# Patient Record
Sex: FEMALE | Race: WHITE | NOT HISPANIC OR LATINO
[De-identification: names, ages, dates, MRNs, and addresses within clinical notes are randomized per-mention and may not be internally consistent; named-entity substitution may affect disease eponyms.]

---

## 2018-03-19 ENCOUNTER — RESULT REVIEW (OUTPATIENT)
Age: 28
End: 2018-03-19

## 2018-04-19 ENCOUNTER — RESULT REVIEW (OUTPATIENT)
Age: 28
End: 2018-04-19

## 2019-02-05 ENCOUNTER — RESULT REVIEW (OUTPATIENT)
Age: 29
End: 2019-02-05

## 2019-02-19 ENCOUNTER — APPOINTMENT (OUTPATIENT)
Dept: OTOLARYNGOLOGY | Facility: CLINIC | Age: 29
End: 2019-02-19
Payer: COMMERCIAL

## 2019-02-19 VITALS
HEIGHT: 65 IN | SYSTOLIC BLOOD PRESSURE: 136 MMHG | HEART RATE: 74 BPM | WEIGHT: 193 LBS | DIASTOLIC BLOOD PRESSURE: 91 MMHG | BODY MASS INDEX: 32.15 KG/M2

## 2019-02-19 DIAGNOSIS — J32.9 CHRONIC SINUSITIS, UNSPECIFIED: ICD-10-CM

## 2019-02-19 DIAGNOSIS — Z83.3 FAMILY HISTORY OF DIABETES MELLITUS: ICD-10-CM

## 2019-02-19 DIAGNOSIS — Z82.49 FAMILY HISTORY OF ISCHEMIC HEART DISEASE AND OTHER DISEASES OF THE CIRCULATORY SYSTEM: ICD-10-CM

## 2019-02-19 DIAGNOSIS — Z86.79 PERSONAL HISTORY OF OTHER DISEASES OF THE CIRCULATORY SYSTEM: ICD-10-CM

## 2019-02-19 DIAGNOSIS — F19.90 OTHER PSYCHOACTIVE SUBSTANCE USE, UNSPECIFIED, UNCOMPLICATED: ICD-10-CM

## 2019-02-19 DIAGNOSIS — Z80.9 FAMILY HISTORY OF MALIGNANT NEOPLASM, UNSPECIFIED: ICD-10-CM

## 2019-02-19 PROCEDURE — 99214 OFFICE O/P EST MOD 30 MIN: CPT | Mod: 25

## 2019-02-19 PROCEDURE — 31231 NASAL ENDOSCOPY DX: CPT

## 2019-02-22 PROBLEM — J32.9 OTHER SINUSITIS: Status: ACTIVE | Noted: 2019-02-22

## 2019-02-22 PROBLEM — Z80.9 FAMILY HISTORY OF MALIGNANT NEOPLASM: Status: ACTIVE | Noted: 2019-02-19

## 2019-02-22 PROBLEM — Z82.49 FAMILY HISTORY OF HYPERTENSION: Status: ACTIVE | Noted: 2019-02-19

## 2019-02-22 PROBLEM — Z86.79 HISTORY OF HYPERTENSION: Status: RESOLVED | Noted: 2019-02-19 | Resolved: 2019-02-22

## 2019-02-22 PROBLEM — Z83.3 FAMILY HISTORY OF DIABETES MELLITUS: Status: ACTIVE | Noted: 2019-02-19

## 2019-02-22 PROBLEM — F19.90 RECREATIONAL DRUG USE: Status: ACTIVE | Noted: 2019-02-19

## 2019-02-22 RX ORDER — PREDNISONE 10 MG
TABLET ORAL
Refills: 0 | Status: ACTIVE | COMMUNITY

## 2019-02-22 RX ORDER — NORETHINDRONE ACETATE AND ETHINYL ESTRADIOL 1.5-30(21)
1.5-3 KIT ORAL
Qty: 28 | Refills: 0 | Status: ACTIVE | COMMUNITY
Start: 2019-01-28

## 2019-02-22 RX ORDER — LOSARTAN POTASSIUM 50 MG/1
50 TABLET, FILM COATED ORAL
Refills: 0 | Status: ACTIVE | COMMUNITY

## 2019-02-22 RX ORDER — NORETHINDRONE 0.35 MG/1
0.35 TABLET ORAL
Qty: 28 | Refills: 0 | Status: ACTIVE | COMMUNITY
Start: 2019-02-05

## 2019-02-22 RX ORDER — PREDNISONE 20 MG/1
20 TABLET ORAL
Qty: 14 | Refills: 0 | Status: ACTIVE | COMMUNITY
Start: 2019-02-13

## 2019-02-22 RX ORDER — BUDESONIDE AND FORMOTEROL FUMARATE DIHYDRATE 160; 4.5 UG/1; UG/1
160-4.5 AEROSOL RESPIRATORY (INHALATION)
Refills: 0 | Status: ACTIVE | COMMUNITY

## 2019-02-22 RX ORDER — AMOXICILLIN AND CLAVULANATE POTASSIUM 875; 125 MG/1; MG/1
875-125 TABLET, COATED ORAL
Qty: 28 | Refills: 0 | Status: ACTIVE | COMMUNITY
Start: 2019-02-13

## 2019-02-22 NOTE — REVIEW OF SYSTEMS
[Patient Intake Form Reviewed] : Patient intake form was reviewed [As Noted in HPI] : as noted in HPI [Negative] : Heme/Lymph caffeine

## 2019-02-22 NOTE — ASSESSMENT
[FreeTextEntry1] : 28F referred for sinusitis. For the past few weeks, she c/o facial and frontal pressure, headache, fatigue and 'fuzzy brain' feeling, in addition to severely weak olfaction and taste. She saw the allergist last week whereupon CT sinus showed severe pansinusitis and was started on augmentin and prednisone (20mg x2wks) and is here in followup.\par Her nasal sx initially started ~9months ago with increased mucus, rhinorrhea and coughing; at that time was told she has allergies and was started on montelukast, flonase and saline rinses. However, her kept worsening until she saw Dr. Sweeney as above; allergy testing only positive for dust.\par On exam, nasal endoscopy shows mild leftward septal deviation with mild mucosal edema, but no gross mucopus or polyps and the middle meati and choanae are clear.\par Her CT scan shows impressive pansinusitis, but I am not sure to the chronicity, since her endoscopic exam today looks much better than the CT; the CT was done before treatment. Regardless, she is still symptomatic. At this point, finish the course of abx and steroids as prescribed and she will repeat CT scan after finishing treatment and f/u with me thereafter to review.

## 2019-02-22 NOTE — CONSULT LETTER
[Dear  ___] : Dear  [unfilled], [Courtesy Letter:] : I had the pleasure of seeing your patient, [unfilled], in my office today. [Consult Closing:] : Thank you very much for allowing me to participate in the care of this patient.  If you have any questions, please do not hesitate to contact me. [Sincerely,] : Sincerely, [DrKen  ___] : Dr. MELO [FreeTextEntry3] : Gilmar Ordoñez MD\par Department of Otolaryngology - Head and Neck Surgery\par [facility]

## 2019-02-22 NOTE — HISTORY OF PRESENT ILLNESS
[de-identified] : 28F here for initial evaluation.\par \par She was referred for sinusitis.\par For the past few weeks, she c/o facial and frontal pressure, headache, fatigue and 'fuzzy brain' feeling, in addition to severely weak olfaction and taste. \par She saw the allergist last week whereupon CT sinus showed severe pansinusitis and was started on augmentin and prednisone (20mg x2wks) and is here in followup.\par Her sx initially started ~9months ago with increased mucus, rhinorrhea and coughing; at that time was told she has allergies and was started on montelukast, flonase and saline rinses. However, her kept worsening until she saw Dr. Sweeney; allergy testing only positive for dust.\par \par CT Sinus 2/13/19 (I reviewed images):\par pansinus mucosal disease, completely opacified right frontal, bilateral ethmoids and right sphenoid, air-fluid level left maxillary sinus. relative sparing of left frontal.\par \par ROS otherwise unremarkable.

## 2019-02-22 NOTE — PHYSICAL EXAM
[Nasal Endoscopy Performed] : nasal endoscopy was performed, see procedure section for findings [] : septum deviated bilaterally [Midline] : trachea located in midline position [Normal] : no rashes

## 2019-02-22 NOTE — PROCEDURE
[FreeTextEntry3] : Nasal Endoscopy:\par leftward septal deflection\par mild mucosal edema\par no gross mucopus/polyps\par middle meati grossly clear\par choana clear, no postnasal drip

## 2019-03-11 ENCOUNTER — APPOINTMENT (OUTPATIENT)
Dept: OTOLARYNGOLOGY | Facility: CLINIC | Age: 29
End: 2019-03-11
Payer: COMMERCIAL

## 2019-03-11 VITALS
SYSTOLIC BLOOD PRESSURE: 130 MMHG | HEIGHT: 65 IN | WEIGHT: 193 LBS | BODY MASS INDEX: 32.15 KG/M2 | HEART RATE: 108 BPM | DIASTOLIC BLOOD PRESSURE: 75 MMHG

## 2019-03-11 PROCEDURE — 99214 OFFICE O/P EST MOD 30 MIN: CPT | Mod: 25

## 2019-03-11 PROCEDURE — 31231 NASAL ENDOSCOPY DX: CPT

## 2019-03-11 NOTE — CONSULT LETTER
[Dear  ___] : Dear  [unfilled], [Courtesy Letter:] : I had the pleasure of seeing your patient, [unfilled], in my office today. [Consult Closing:] : Thank you very much for allowing me to participate in the care of this patient.  If you have any questions, please do not hesitate to contact me. [Sincerely,] : Sincerely, [DrKen  ___] : Dr. MELO [DrKen ___] : Dr. MELO [FreeTextEntry3] : Gilmar Ordoñez MD\par Department of Otolaryngology - Head and Neck Surgery\par [facility]

## 2019-03-11 NOTE — HISTORY OF PRESENT ILLNESS
[de-identified] : 28F here in followup.\par \par She remains symptomatic despite additional antibiotics and steroids. While on the steroids she felt better but sx returned 1-2 days after finishing.\par Repeat CT sinus after treatment continues to show pansinus disease-\par \par CT Sinus 3/8/19 (I reviewed images):\par pansinus mucosal disease with near complete left maxillary and right frontal opacification with mild mucosal disease both sphenoids. \par \par She was initially referred for sinusitis.\par For the past few months, she c/o facial and frontal pressure, headache, fatigue and 'fuzzy brain' feeling, in addition to severely weak olfaction and taste. After her prior visit with decongestion and drainage, she had felt better and was able to smell for several days.\par Initial CT 2/13/19 showed severe pansinusitis and was started on augmentin and prednisone (20mg x2wks).\par \par Her sx initially started ~10months ago with increased mucus, rhinorrhea and coughing; at that time was told she has allergies and was started on montelukast, flonase and saline rinses. However, her kept worsening until CT was done last month. Allergy testing only positive for dust.\par \par ROS otherwise unremarkable.

## 2019-03-11 NOTE — ASSESSMENT
[FreeTextEntry1] : 28F here in followup. since last seen four weeks prior, she remains symptomatic despite additional antibiotics and steroids. While on the steroids she felt better but sx returned 1-2 days after finishing. Repeat CT sinus after treatment continues to show pansinus disease with near complete left maxillary and right frontal opacification.\par She was initially referred for several months of facial and frontal pressure, headache, fatigue and 'fuzzy brain' feeling, in addition to severely weak olfaction and taste. After her initial visit with decongestion and drainage, she had felt better and was able to smell for several days. Her sx initially started ~10months ago with increased mucus, rhinorrhea and coughing; at that time was told she has allergies and was started on montelukast, flonase and saline rinses. However, she kept worsening until CT was done which showed pansinusitis. Allergy testing only positive for dust.\par On exam, nasal endoscopy shows mild leftward septal deviation with mild mucosal edema, but no gross mucopus or polyps and the middle meati and choanae are clear - essentially unchanged from last visit.\par Her CT scan continues to show impressive pansinusitis, despite multiple courses of antibiotics and oral steroids, and as such, is likely chronic, without polyps. However, her endoscopic exam today, like last visit, looks much better than her CT from 3 days ago. Regardless, she remains symptomatic. At this point, I think next step is for sinus surgery to reestablish sinonasal osteomeatal patency and ultimately reset her inflammatory cycle. OR tentative for 4/8/19. She will be seeing the neurologist for her headaches in the interim as well. Will need preop steroids.

## 2019-03-11 NOTE — DATA REVIEWED
[de-identified] : CT Sinus 3/2019 see HPI\par \par CT Sinus 2/13/19 (I reviewed images):\par pansinus mucosal disease, completely opacified right frontal, bilateral ethmoids and right sphenoid, air-fluid level left maxillary sinus. relative sparing of left frontal.

## 2019-03-14 ENCOUNTER — TRANSCRIPTION ENCOUNTER (OUTPATIENT)
Age: 29
End: 2019-03-14

## 2019-03-19 RX ORDER — PREDNISONE 20 MG/1
20 TABLET ORAL
Qty: 6 | Refills: 0 | Status: ACTIVE | COMMUNITY
Start: 2019-03-19 | End: 1900-01-01

## 2019-04-08 ENCOUNTER — OUTPATIENT (OUTPATIENT)
Dept: OUTPATIENT SERVICES | Facility: HOSPITAL | Age: 29
LOS: 1 days | Discharge: HOME | End: 2019-04-08
Payer: COMMERCIAL

## 2019-04-08 ENCOUNTER — APPOINTMENT (OUTPATIENT)
Dept: OTOLARYNGOLOGY | Facility: HOSPITAL | Age: 29
End: 2019-04-08

## 2019-04-08 ENCOUNTER — RESULT REVIEW (OUTPATIENT)
Age: 29
End: 2019-04-08

## 2019-04-08 PROCEDURE — 31267 ENDOSCOPY MAXILLARY SINUS: CPT | Mod: 50

## 2019-04-08 PROCEDURE — 31253 NSL/SINS NDSC TOTAL: CPT | Mod: 50

## 2019-04-08 PROCEDURE — 30130 EXCISE INFERIOR TURBINATE: CPT | Mod: 50

## 2019-04-08 PROCEDURE — 61782 SCAN PROC CRANIAL EXTRA: CPT

## 2019-04-08 PROCEDURE — 30520 REPAIR OF NASAL SEPTUM: CPT

## 2019-04-08 PROCEDURE — 31288 NASAL/SINUS ENDOSCOPY SURG: CPT | Mod: 50

## 2019-04-08 RX ORDER — PREDNISONE 20 MG/1
20 TABLET ORAL
Qty: 10 | Refills: 0 | Status: ACTIVE | COMMUNITY
Start: 2019-04-08 | End: 1900-01-01

## 2019-04-08 RX ORDER — CEFUROXIME AXETIL 500 MG/1
500 TABLET ORAL
Qty: 16 | Refills: 0 | Status: ACTIVE | COMMUNITY
Start: 2019-04-08 | End: 1900-01-01

## 2019-04-08 RX ORDER — AVOBENZONE, HOMOSALATE, OCTISALATE, OCTOCRYLENE 2; 4; 4; 2 G/100ML; G/100ML; G/100ML; G/100ML
0.65 CREAM TOPICAL
Qty: 2 | Refills: 5 | Status: ACTIVE | COMMUNITY
Start: 2019-04-08 | End: 1900-01-01

## 2019-04-09 LAB
GRAM STN FLD: SIGNIFICANT CHANGE UP
GRAM STN FLD: SIGNIFICANT CHANGE UP
SPECIMEN SOURCE: SIGNIFICANT CHANGE UP
SPECIMEN SOURCE: SIGNIFICANT CHANGE UP

## 2019-04-10 LAB
-  CEFAZOLIN: SIGNIFICANT CHANGE UP
-  CLINDAMYCIN: SIGNIFICANT CHANGE UP
-  ERYTHROMYCIN: SIGNIFICANT CHANGE UP
-  LINEZOLID: SIGNIFICANT CHANGE UP
-  OXACILLIN: SIGNIFICANT CHANGE UP
-  PENICILLIN: SIGNIFICANT CHANGE UP
-  RIFAMPIN: SIGNIFICANT CHANGE UP
-  TRIMETHOPRIM/SULFAMETHOXAZOLE: SIGNIFICANT CHANGE UP
-  VANCOMYCIN: SIGNIFICANT CHANGE UP
CULTURE RESULTS: SIGNIFICANT CHANGE UP
CULTURE RESULTS: SIGNIFICANT CHANGE UP
METHOD TYPE: SIGNIFICANT CHANGE UP
ORGANISM # SPEC MICROSCOPIC CNT: SIGNIFICANT CHANGE UP
ORGANISM # SPEC MICROSCOPIC CNT: SIGNIFICANT CHANGE UP
SPECIMEN SOURCE: SIGNIFICANT CHANGE UP

## 2019-04-15 LAB — SURGICAL PATHOLOGY STUDY: SIGNIFICANT CHANGE UP

## 2019-04-16 ENCOUNTER — APPOINTMENT (OUTPATIENT)
Dept: OTOLARYNGOLOGY | Facility: CLINIC | Age: 29
End: 2019-04-16
Payer: COMMERCIAL

## 2019-04-16 VITALS
BODY MASS INDEX: 31.49 KG/M2 | DIASTOLIC BLOOD PRESSURE: 98 MMHG | WEIGHT: 189 LBS | HEIGHT: 65 IN | SYSTOLIC BLOOD PRESSURE: 120 MMHG | HEART RATE: 84 BPM

## 2019-04-16 PROCEDURE — 99024 POSTOP FOLLOW-UP VISIT: CPT

## 2019-04-16 PROCEDURE — 31237 NSL/SINS NDSC SURG BX POLYPC: CPT | Mod: 50,58

## 2019-04-16 NOTE — PROCEDURE
[FreeTextEntry3] : doyles and propels removed\par \par Nasal Endoscopy:\par coagulum and debris removed\par septum midline and intact, nasal airways widely patent\par middle meati patent, paranasal sinuses widely patent\par

## 2019-04-16 NOTE — CONSULT LETTER
[Dear  ___] : Dear  [unfilled], [Courtesy Letter:] : I had the pleasure of seeing your patient, [unfilled], in my office today. [Consult Closing:] : Thank you very much for allowing me to participate in the care of this patient.  If you have any questions, please do not hesitate to contact me. [Sincerely,] : Sincerely, [DrKen  ___] : Dr. MELO [DrKen ___] : Dr. MELO [FreeTextEntry3] : Gilmar Ordoñez MD\par Department of Otolaryngology - Head and Neck Surgery\par [facility]

## 2019-04-16 NOTE — DATA REVIEWED
[de-identified] : CT Sinus 3/2019 see HPI\par \par CT Sinus 2/13/19 (I reviewed images):\par pansinus mucosal disease, completely opacified right frontal, bilateral ethmoids and right sphenoid, air-fluid level left maxillary sinus. relative sparing of left frontal.

## 2019-04-16 NOTE — ASSESSMENT
[FreeTextEntry1] : 28F here in first postoperative visit s/p SMR/turbs/ESS (maxillary, ethmoid, sphenoid, frontal) for chronic pansinusitis without polyps. Intraoperatively, there was thick mucus draining from left maxillary and and occluded right frontal outflow with polypoid edema. Final pathology c/w eosinophilic sinusitis. She is doing very well since surgery with no complaints. She took the medication as prescribed. She has not needed any inhalers since surgery. On exam, nasal endocopy shows well healing postoperative changes with widely patent paranasal sinuses, middle meati and nasal airways.\par She is doing very well. Will start mometasone rinses today and continue daily. Saline throughout the day. Restart allergy meds. RTO 4 weeks for routine postop visit.

## 2019-04-16 NOTE — HISTORY OF PRESENT ILLNESS
[de-identified] : 28F here in first postoperative visit s/p SMR/turbs/ESS (maxillary, ethmoid, sphenoid, frontal) for chronic pansinusitis without polyps. Intraoperatively, there was thick mucus draining from left maxillary and and occluded right frontal outflow with polypoid edema.\par \par She is doing very well since surgery with no complaints. She took the medication as prescribed. She has not needed any inhalers since surgery.\par \par OR cx:\par rare staph epidermidis, pansens\par \par Pathology:\par 1. Nasal septum:\par - Fragments of nasal bone and cartilage.\par 2. Left sinus contents:\par - Bone and respiratory mucosa with focally thickened subepithelial collagen.\par 3. Right sinus contents:\par - Bone and respiratory mucosa with focal chronic inflammation and thickened subepithelial collagen.\par 4. Bilateral sinus shavings:\par - Bone and respiratory mucosa with focal marked thickening of subepithelial collagen.\par 5. Right sphenoid:\par - Respiratory mucosa with edema and chronic active inflammation.\par 6. Right frontal sinus:\par - Detached inspissated mucus admixed with degenerated eosinophils.\par - GMS stain, negative for fungus.\par 7. Partial left inferior turbinate:\par - Portion of turbinate, with increased superficial ectatic thin-walled vessels\par 8. Partial right inferior turbinate:\par - Portion of turbinate with markedly ectatic thin-walled vessels.\par \par ROS otherwise unremarkable

## 2019-04-18 ENCOUNTER — INPATIENT (INPATIENT)
Facility: HOSPITAL | Age: 29
LOS: 0 days | Discharge: HOME | DRG: 921 | End: 2019-04-19
Attending: OTOLARYNGOLOGY | Admitting: OTOLARYNGOLOGY
Payer: COMMERCIAL

## 2019-04-18 VITALS
TEMPERATURE: 98 F | SYSTOLIC BLOOD PRESSURE: 125 MMHG | WEIGHT: 194.01 LBS | OXYGEN SATURATION: 98 % | RESPIRATION RATE: 20 BRPM | HEART RATE: 112 BPM | DIASTOLIC BLOOD PRESSURE: 84 MMHG | HEIGHT: 65 IN

## 2019-04-18 DIAGNOSIS — Z98.890 OTHER SPECIFIED POSTPROCEDURAL STATES: Chronic | ICD-10-CM

## 2019-04-18 LAB
ALBUMIN SERPL ELPH-MCNC: 3.6 G/DL — SIGNIFICANT CHANGE UP (ref 3.3–5)
ALP SERPL-CCNC: 73 U/L — SIGNIFICANT CHANGE UP (ref 40–120)
ALT FLD-CCNC: 29 U/L — SIGNIFICANT CHANGE UP (ref 10–45)
ANION GAP SERPL CALC-SCNC: 9 MMOL/L — SIGNIFICANT CHANGE UP (ref 5–17)
APTT BLD: 25.3 SEC — LOW (ref 27.5–36.3)
AST SERPL-CCNC: 17 U/L — SIGNIFICANT CHANGE UP (ref 10–40)
BASOPHILS # BLD AUTO: 0.03 K/UL — SIGNIFICANT CHANGE UP (ref 0–0.2)
BASOPHILS NFR BLD AUTO: 0.3 % — SIGNIFICANT CHANGE UP (ref 0–2)
BILIRUB SERPL-MCNC: 0.6 MG/DL — SIGNIFICANT CHANGE UP (ref 0.2–1.2)
BLD GP AB SCN SERPL QL: NEGATIVE — SIGNIFICANT CHANGE UP
BUN SERPL-MCNC: 12 MG/DL — SIGNIFICANT CHANGE UP (ref 7–23)
CALCIUM SERPL-MCNC: 9.2 MG/DL — SIGNIFICANT CHANGE UP (ref 8.4–10.5)
CHLORIDE SERPL-SCNC: 100 MMOL/L — SIGNIFICANT CHANGE UP (ref 96–108)
CO2 SERPL-SCNC: 26 MMOL/L — SIGNIFICANT CHANGE UP (ref 22–31)
CREAT SERPL-MCNC: 0.79 MG/DL — SIGNIFICANT CHANGE UP (ref 0.5–1.3)
EOSINOPHIL # BLD AUTO: 0.05 K/UL — SIGNIFICANT CHANGE UP (ref 0–0.5)
EOSINOPHIL NFR BLD AUTO: 0.4 % — SIGNIFICANT CHANGE UP (ref 0–6)
GLUCOSE SERPL-MCNC: 132 MG/DL — HIGH (ref 70–99)
HCT VFR BLD CALC: 38.6 % — SIGNIFICANT CHANGE UP (ref 34.5–45)
HGB BLD-MCNC: 12.6 G/DL — SIGNIFICANT CHANGE UP (ref 11.5–15.5)
IMM GRANULOCYTES NFR BLD AUTO: 0.9 % — SIGNIFICANT CHANGE UP (ref 0–1.5)
INR BLD: 0.91 — SIGNIFICANT CHANGE UP (ref 0.88–1.16)
LYMPHOCYTES # BLD AUTO: 1.89 K/UL — SIGNIFICANT CHANGE UP (ref 1–3.3)
LYMPHOCYTES # BLD AUTO: 16.8 % — SIGNIFICANT CHANGE UP (ref 13–44)
MCHC RBC-ENTMCNC: 32.1 PG — SIGNIFICANT CHANGE UP (ref 27–34)
MCHC RBC-ENTMCNC: 32.6 GM/DL — SIGNIFICANT CHANGE UP (ref 32–36)
MCV RBC AUTO: 98.5 FL — SIGNIFICANT CHANGE UP (ref 80–100)
MONOCYTES # BLD AUTO: 0.6 K/UL — SIGNIFICANT CHANGE UP (ref 0–0.9)
MONOCYTES NFR BLD AUTO: 5.3 % — SIGNIFICANT CHANGE UP (ref 2–14)
NEUTROPHILS # BLD AUTO: 8.59 K/UL — HIGH (ref 1.8–7.4)
NEUTROPHILS NFR BLD AUTO: 76.3 % — SIGNIFICANT CHANGE UP (ref 43–77)
NRBC # BLD: 0 /100 WBCS — SIGNIFICANT CHANGE UP (ref 0–0)
PLATELET # BLD AUTO: 345 K/UL — SIGNIFICANT CHANGE UP (ref 150–400)
POTASSIUM SERPL-MCNC: 4.5 MMOL/L — SIGNIFICANT CHANGE UP (ref 3.5–5.3)
POTASSIUM SERPL-SCNC: 4.5 MMOL/L — SIGNIFICANT CHANGE UP (ref 3.5–5.3)
PROT SERPL-MCNC: 6.9 G/DL — SIGNIFICANT CHANGE UP (ref 6–8.3)
PROTHROM AB SERPL-ACNC: 10.3 SEC — SIGNIFICANT CHANGE UP (ref 10–12.9)
RBC # BLD: 3.92 M/UL — SIGNIFICANT CHANGE UP (ref 3.8–5.2)
RBC # FLD: 12.4 % — SIGNIFICANT CHANGE UP (ref 10.3–14.5)
RH IG SCN BLD-IMP: POSITIVE — SIGNIFICANT CHANGE UP
SODIUM SERPL-SCNC: 135 MMOL/L — SIGNIFICANT CHANGE UP (ref 135–145)
WBC # BLD: 11.26 K/UL — HIGH (ref 3.8–10.5)
WBC # FLD AUTO: 11.26 K/UL — HIGH (ref 3.8–10.5)

## 2019-04-18 PROCEDURE — 99285 EMERGENCY DEPT VISIT HI MDM: CPT

## 2019-04-18 PROCEDURE — 31238 NSL/SINS NDSC SRG NSL HEMRRG: CPT | Mod: LT,78

## 2019-04-18 RX ORDER — MONTELUKAST 4 MG/1
10 TABLET, CHEWABLE ORAL DAILY
Qty: 0 | Refills: 0 | Status: DISCONTINUED | OUTPATIENT
Start: 2019-04-18 | End: 2019-04-18

## 2019-04-18 RX ORDER — LOSARTAN POTASSIUM 100 MG/1
50 TABLET, FILM COATED ORAL DAILY
Qty: 0 | Refills: 0 | Status: DISCONTINUED | OUTPATIENT
Start: 2019-04-18 | End: 2019-04-18

## 2019-04-18 RX ORDER — CHOLECALCIFEROL (VITAMIN D3) 125 MCG
0 CAPSULE ORAL
Qty: 0 | Refills: 0 | COMMUNITY

## 2019-04-18 RX ORDER — MAGNESIUM OXIDE 400 MG ORAL TABLET 241.3 MG
2 TABLET ORAL
Qty: 0 | Refills: 0 | COMMUNITY

## 2019-04-18 RX ORDER — ALPRAZOLAM 0.25 MG
0.25 TABLET ORAL EVERY 6 HOURS
Qty: 0 | Refills: 0 | Status: DISCONTINUED | OUTPATIENT
Start: 2019-04-18 | End: 2019-04-19

## 2019-04-18 RX ORDER — SODIUM CHLORIDE 9 MG/ML
1000 INJECTION INTRAMUSCULAR; INTRAVENOUS; SUBCUTANEOUS ONCE
Qty: 0 | Refills: 0 | Status: COMPLETED | OUTPATIENT
Start: 2019-04-18 | End: 2019-04-18

## 2019-04-18 RX ORDER — MONTELUKAST 4 MG/1
1 TABLET, CHEWABLE ORAL
Qty: 0 | Refills: 0 | COMMUNITY

## 2019-04-18 RX ORDER — PREGABALIN 225 MG/1
0 CAPSULE ORAL
Qty: 0 | Refills: 0 | COMMUNITY

## 2019-04-18 RX ORDER — LOSARTAN POTASSIUM 100 MG/1
1 TABLET, FILM COATED ORAL
Qty: 0 | Refills: 0 | COMMUNITY

## 2019-04-18 RX ORDER — ACETAMINOPHEN 500 MG
650 TABLET ORAL EVERY 6 HOURS
Qty: 0 | Refills: 0 | Status: DISCONTINUED | OUTPATIENT
Start: 2019-04-18 | End: 2019-04-19

## 2019-04-18 RX ORDER — ALPRAZOLAM 0.25 MG
0.25 TABLET ORAL ONCE
Qty: 0 | Refills: 0 | Status: DISCONTINUED | OUTPATIENT
Start: 2019-04-18 | End: 2019-04-18

## 2019-04-18 RX ADMIN — Medication 650 MILLIGRAM(S): at 16:47

## 2019-04-18 RX ADMIN — Medication 0.25 MILLIGRAM(S): at 17:19

## 2019-04-18 RX ADMIN — Medication 0.5 MILLIGRAM(S): at 13:27

## 2019-04-18 RX ADMIN — Medication 650 MILLIGRAM(S): at 18:24

## 2019-04-18 RX ADMIN — SODIUM CHLORIDE 1000 MILLILITER(S): 9 INJECTION INTRAMUSCULAR; INTRAVENOUS; SUBCUTANEOUS at 11:34

## 2019-04-18 NOTE — ASU DISCHARGE PLAN (ADULT/PEDIATRIC) - ASU DISCHARGE DATE/TIME
[facility] Progress Note     Length of treatment: 60-minute  (4 of 5-charges billed on 1st visit)  Reviewed contraindications/current health status with Patient     Past Medical History:   Other and unspecified hyperlipidemia                          Endometriosis, site unspecified                                 Comment: Endometriosis    Functional diarrhea                             1-11-06         Comment: colonoscopy/    Nausea alone                                                  Irritable bowel                                                 Comment: diarrhea/ nausea ; now constipation     ROTATOR CUFF SYND NOS-L                         3/25/2009       Comment: left     Symptomatic menopausal or female climacteric s* 12/4/2012     Ganglion Just Proximal R Great Toe Bunion (~1x* 10/24/2013    Presbyopia                                      9/16/2004     Benign cyst of right breast                     6/2016          Comment: Multiple benign right breast cyst. No                suspicious sonographic masses.    Hyperlipidemia                                  1/18/2017     Family history of abdominal aortic aneurysm     3/20/2018      Allergies/Medications: see list in electronic medical record     Subjective:  Patient complains of:  Doing good wants relaxation, bilateral neck tense  Pain report Prior to treatment:  0/10   Type of treatment requested: Wellness massage  Specific Requests:  Whole body      Objective Observations:  Hypertonicity noted in: lumbar paraspinals, bilateral rhomboids, bilateral upper trapezius, bilateral supraspinatus, bilateral upper trapezius, cervical paraspinals, SO region  Decreased ROM noted in none  Additional Observations:  none  Patient received Cross-fiber friction, Longitudinal friction, Trigger point therapy, Swedish techniques and Myofascial release to affected tissues.  Additional treatment provided: none this date     Assessment:  Response to  treatment: Hypertonicity in above noted tissues decreased and Patient gave positive verbal feedback  Pain report after treatment: 0/10  Education/Resources: none this date     Plan/Recommendations:  Increase water consumption  Massage therapy monthly as desired to decrease tension and stress  Continue stretching  Session concluded   18-Apr-2019 19:15

## 2019-04-18 NOTE — H&P ADULT - ASSESSMENT
28F s/p FESS 10 days ago, had brief episode of pulsatile bleeding overnight, now resolved. Likely source L SPA, now pre-op for control of nasal hemorrhage.  -NPO  -Pre-op labs done, WNL  -Consented and added on  -Please page with questions/concerns

## 2019-04-18 NOTE — ED PROVIDER NOTE - OBJECTIVE STATEMENT
29 y/o female with a PMHx of sinusitis and s/p 10 days from septoplasty who is here with epistaxis since procedure. Pt reports bleeding is bright red and intermittent with difficulty controlling the bleeding. She was seen yesterday and given afrin with slight improvement. She denies the following: fever, chills, headaches, dizziness, hx of bleeding disorder.

## 2019-04-18 NOTE — H&P ADULT - HISTORY OF PRESENT ILLNESS
28F POD10 FESS, patient had episode of pulsatile bleeding overnight, went to outside ER, resolved after ~1 hour with external pressure.

## 2019-04-18 NOTE — ED ADULT NURSE NOTE - OBJECTIVE STATEMENT
Pt states she came to ER because she has been having nose bleeding since Tuesday after her nasal surgery on Monday but last night was very severe and her ENT told her to come to ER for possible OR procedure.

## 2019-04-18 NOTE — ED PROVIDER NOTE - CARE PLAN
Principal Discharge DX:	Epistaxis  Secondary Diagnosis:	Sinusitis  Secondary Diagnosis:	Postoperative complication

## 2019-04-18 NOTE — ED PROVIDER NOTE - ATTENDING CONTRIBUTION TO CARE
27 y/o female with a PMHx of sinusitis and s/p 10 days from septoplasty who is here with epistaxis since procedure. Pt reports bleeding is bright red and intermittent with difficulty controlling the bleeding. She was seen yesterday and given afrin with slight improvement. She denies the following: fever, chills, headaches, dizziness, hx of bleeding disorder.    HPI and PE as per PA  Bleeding controlled in ED and no active bleeding    As per ENT pt here for OR intervention  Pre op labs ordered in ED    Pt admitted in stable condtion

## 2019-04-18 NOTE — ED ADULT TRIAGE NOTE - CHIEF COMPLAINT QUOTE
pt c/o nose bleed since yesterday. report going to [facility] last night treated with Afrin and d/c but bleed came back today. Had a sinus surgery on 4/8/19 by Dr. Ordoñez and was advised to come back for ENT eval.

## 2019-04-18 NOTE — ED PROVIDER NOTE - CLINICAL SUMMARY MEDICAL DECISION MAKING FREE TEXT BOX
27 y/o female s/p 10 days from septoplasty and turbinate removal with epistaxis. ENT at bedside. Pt well and non-toxic appearing, no active epistaxis and no acute signs of infections.  Admission for OR with Dr. Ordoñez. Discussed with ENT team who request admission and will follow labs.

## 2019-04-18 NOTE — PROGRESS NOTE ADULT - SUBJECTIVE AND OBJECTIVE BOX
28F underwent bilateral endoscopic sinus surgery 4/8/19 for chronic pansinusitis without polyps presents after having episode of brisk bright red epistaxis last night. She was choking on the amount of bleeding and was very scary. It lasted about 20 minutes. She called ambulance and went to OSH ED but on presentation, the bleeding had stopped. Labs were normal and she was sent home.  I was notified about this this morning and told pt to come to ED ASAP for evaluation.  Labs normal.  She has headache, but otherwise no complaints.  No bleeding since last night.    Story convincing for posterior epistaxis. Intraoperatively, a left SPA branch was cauterized during sphenoidotomy; this is likely the source of bleeding.  Plan for endoscopic exam under anesthesia with control of nasal hemorrhage.    This was all discussed at length with pt and her ; all questions were answered and she wishes to proceed.

## 2019-04-18 NOTE — ASU DISCHARGE PLAN (ADULT/PEDIATRIC) - CALL YOUR DOCTOR IF YOU HAVE ANY OF THE FOLLOWING:
Fever greater than (need to indicate Fahrenheit or Celsius)/Pain not relieved by Medications/Bleeding that does not stop/101.4

## 2019-04-19 ENCOUNTER — TRANSCRIPTION ENCOUNTER (OUTPATIENT)
Age: 29
End: 2019-04-19

## 2019-04-19 VITALS
RESPIRATION RATE: 17 BRPM | SYSTOLIC BLOOD PRESSURE: 125 MMHG | HEART RATE: 108 BPM | OXYGEN SATURATION: 97 % | TEMPERATURE: 99 F | DIASTOLIC BLOOD PRESSURE: 81 MMHG

## 2019-04-19 RX ADMIN — Medication 0.25 MILLIGRAM(S): at 02:23

## 2019-04-19 NOTE — DISCHARGE NOTE PROVIDER - NSDCFUADDAPPT_GEN_ALL_CORE_FT
Keep already scheduled appointment with Dr. Ordoñez for 3 weeks from now    1.	Report any fever, chills, difficulty breathing, and difficulty swallowing, bleeding or purulent drainage from your nose to your doctor immediately.  2.	Diet: Resume normal diet.  3.	Activity: No heavy lifting; do not lift anything heavier than a gallon of milk until after your follow up appointment; no strenuous activity such as running or biking.  4.	Shower/Bathing: You may shower/bathe normally.  5.	Take all medications as prescribed.   6.	Continue all of your normal home medications unless told otherwise by your doctor.  7.	Avoid any NSAIDS or ibuprofen/aspiring containing products. You may take Tylenol for mild pain.  8.	Begin saline nasal rinses 3 days after leaving hospital  9.	Sinus precautions: NO NOSE BLOWING; sneeze with your mouth open; do not put your head below the level of your heart; no straining; do not drink with a straw.

## 2019-04-19 NOTE — DISCHARGE NOTE PROVIDER - NSDCCPCAREPLAN_GEN_ALL_CORE_FT
PRINCIPAL DISCHARGE DIAGNOSIS  Diagnosis: Epistaxis  Assessment and Plan of Treatment:       SECONDARY DISCHARGE DIAGNOSES  Diagnosis: Postoperative complication  Assessment and Plan of Treatment:     Diagnosis: Sinusitis  Assessment and Plan of Treatment:

## 2019-04-19 NOTE — DISCHARGE NOTE PROVIDER - HOSPITAL COURSE
28F s/p cauterization of nasal vessels after bleeding previous night. Patient did well post op, stable for discharge home

## 2019-04-19 NOTE — DISCHARGE NOTE PROVIDER - CARE PROVIDER_API CALL
Gilmar Ordoñez)  Otolaryngology  [address] [place] [address], [address]  [place], [place] [address]  Phone: [phone]  Fax: [phone]  Follow Up Time:

## 2019-04-19 NOTE — DISCHARGE NOTE NURSING/CASE MANAGEMENT/SOCIAL WORK - NSDCDPATPORTLINK_GEN_ALL_CORE
You can access the [facility] Patient Portal, offered by [facility], by registering with the following website: [email or web address]

## 2019-04-21 PROCEDURE — 99285 EMERGENCY DEPT VISIT HI MDM: CPT

## 2019-04-21 PROCEDURE — 86850 RBC ANTIBODY SCREEN: CPT

## 2019-04-21 PROCEDURE — 85610 PROTHROMBIN TIME: CPT

## 2019-04-21 PROCEDURE — 80053 COMPREHEN METABOLIC PANEL: CPT

## 2019-04-21 PROCEDURE — 36415 COLL VENOUS BLD VENIPUNCTURE: CPT

## 2019-04-21 PROCEDURE — 85730 THROMBOPLASTIN TIME PARTIAL: CPT

## 2019-04-21 PROCEDURE — 85025 COMPLETE CBC W/AUTO DIFF WBC: CPT

## 2019-04-21 PROCEDURE — 86900 BLOOD TYPING SEROLOGIC ABO: CPT

## 2019-04-21 PROCEDURE — C9399: CPT

## 2019-04-21 PROCEDURE — 84702 CHORIONIC GONADOTROPIN TEST: CPT

## 2019-04-21 PROCEDURE — 86901 BLOOD TYPING SEROLOGIC RH(D): CPT

## 2019-04-24 DIAGNOSIS — J32.4 CHRONIC PANSINUSITIS: ICD-10-CM

## 2019-04-24 DIAGNOSIS — J45.909 UNSPECIFIED ASTHMA, UNCOMPLICATED: ICD-10-CM

## 2019-04-24 DIAGNOSIS — R04.0 EPISTAXIS: ICD-10-CM

## 2019-04-24 DIAGNOSIS — I10 ESSENTIAL (PRIMARY) HYPERTENSION: ICD-10-CM
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
## 2019-04-24 DIAGNOSIS — J95.830 POSTPROCEDURAL HEMORRHAGE OF A RESPIRATORY SYSTEM ORGAN OR STRUCTURE FOLLOWING A RESPIRATORY SYSTEM PROCEDURE: ICD-10-CM

## 2019-05-21 ENCOUNTER — APPOINTMENT (OUTPATIENT)
Dept: OTOLARYNGOLOGY | Facility: CLINIC | Age: 29
End: 2019-05-21
Payer: COMMERCIAL

## 2019-05-21 VITALS
HEART RATE: 100 BPM | DIASTOLIC BLOOD PRESSURE: 106 MMHG | WEIGHT: 192 LBS | SYSTOLIC BLOOD PRESSURE: 135 MMHG | BODY MASS INDEX: 31.99 KG/M2 | HEIGHT: 65 IN

## 2019-05-21 PROBLEM — G43.909 MIGRAINE, UNSPECIFIED, NOT INTRACTABLE, WITHOUT STATUS MIGRAINOSUS: Chronic | Status: ACTIVE | Noted: 2019-04-18

## 2019-05-21 PROBLEM — J45.909 UNSPECIFIED ASTHMA, UNCOMPLICATED: Chronic | Status: ACTIVE | Noted: 2019-04-18

## 2019-05-21 PROBLEM — I10 ESSENTIAL (PRIMARY) HYPERTENSION: Chronic | Status: ACTIVE | Noted: 2019-04-18

## 2019-05-21 PROCEDURE — 99024 POSTOP FOLLOW-UP VISIT: CPT

## 2019-05-21 PROCEDURE — 31231 NASAL ENDOSCOPY DX: CPT | Mod: 58

## 2019-05-21 NOTE — ASSESSMENT
[FreeTextEntry1] : 28F here in routine postoperative visit s/p SMR/turbs/ESS (maxillary, ethmoid, sphenoid, frontal) for chronic pansinusitis without polyps 4/8/19. Intraoperatively, there was thick mucus draining from left maxillary and an occluded right frontal outflow with polypoid edema. Pathology c/w eosinophilic sinusitis. Postoperative course was c/b left sided epistaxis s/p control in OR 4/16/19. She is doing very well since last seen with no complaints. She remains on the mometasone rinses. There is no pain, congestion, headache or pressure. She has not needed any inhalers since surgery. On exam, nasal endoscopy shows well healed postoperative changes with widely patent paranasal sinuses, middle meati and nasal airways. She is doing very well. Continue daily mometasone rinses and allergy meds. RTO 2 months for routine postop visit.

## 2019-05-21 NOTE — DATA REVIEWED
[de-identified] : CT Sinus 3/2019 see HPI\par \par CT Sinus 2/13/19 (I reviewed images):\par pansinus mucosal disease, completely opacified right frontal, bilateral ethmoids and right sphenoid, air-fluid level left maxillary sinus. relative sparing of left frontal.

## 2019-05-21 NOTE — PROCEDURE
[FreeTextEntry3] : Nasal Endoscopy:\par minimal debris removed\par septum midline and intact, nasal airways widely patent\par mild polypoid edema over left posterior ethmoid/rostrum\par middle meati widely patent, paranasal sinuses widely patent\par frontals widely patent b/l, no mucopus, edema or polyps.\par

## 2019-05-21 NOTE — CONSULT LETTER
[Dear  ___] : Dear  [unfilled], [Courtesy Letter:] : I had the pleasure of seeing your patient, [unfilled], in my office today. [Consult Closing:] : Thank you very much for allowing me to participate in the care of this patient.  If you have any questions, please do not hesitate to contact me. [Sincerely,] : Sincerely, [FreeTextEntry3] : Gilmar Ordoñez MD\par Department of Otolaryngology - Head and Neck Surgery\par [facility] [DrKen  ___] : Dr. MELO [DrKen ___] : Dr. MELO

## 2019-05-21 NOTE — HISTORY OF PRESENT ILLNESS
[de-identified] : 28F here in routine postoperative visit s/p SMR/turbs/ESS (maxillary, ethmoid, sphenoid, frontal) for chronic pansinusitis without polyps 4/8/19. Intraoperatively, there was thick mucus draining from left maxillary and and occluded right frontal outflow with polypoid edema. Postoperative course c/b left sided epistaxis s/p control in OR 4/16/19. Initial pathology c/w eosinophilic sinusitis.\par \par She is doing very well since last seen with no complaints. She remains on the mometasone rinses. There is no pain, congestion, headache or pressure. She has not needed any inhalers since surgery.\par \par ROS otherwise unremarkable

## 2019-08-13 ENCOUNTER — APPOINTMENT (OUTPATIENT)
Dept: OTOLARYNGOLOGY | Facility: CLINIC | Age: 29
End: 2019-08-13
Payer: COMMERCIAL

## 2019-08-13 VITALS
HEART RATE: 80 BPM | DIASTOLIC BLOOD PRESSURE: 80 MMHG | SYSTOLIC BLOOD PRESSURE: 138 MMHG | BODY MASS INDEX: 31.65 KG/M2 | HEIGHT: 65 IN | WEIGHT: 190 LBS

## 2019-08-13 PROCEDURE — 31231 NASAL ENDOSCOPY DX: CPT

## 2019-08-13 PROCEDURE — 99213 OFFICE O/P EST LOW 20 MIN: CPT | Mod: 25

## 2019-08-13 RX ORDER — BENZONATATE 100 MG/1
100 CAPSULE ORAL
Qty: 21 | Refills: 0 | Status: ACTIVE | COMMUNITY
Start: 2019-03-25

## 2019-08-13 RX ORDER — NORETHINDRONE ACETATE AND ETHINYL ESTRADIOL 1MG-20(21)
1-20 KIT ORAL
Qty: 28 | Refills: 0 | Status: ACTIVE | COMMUNITY
Start: 2019-08-08

## 2019-08-13 RX ORDER — AZITHROMYCIN 250 MG/1
250 TABLET, FILM COATED ORAL
Qty: 6 | Refills: 0 | Status: ACTIVE | COMMUNITY
Start: 2019-03-25

## 2019-08-13 RX ORDER — RIZATRIPTAN BENZOATE 10 MG/1
10 TABLET, ORALLY DISINTEGRATING ORAL
Qty: 9 | Refills: 0 | Status: ACTIVE | COMMUNITY
Start: 2019-06-03

## 2019-08-13 RX ORDER — KETOCONAZOLE 20.5 MG/ML
2 SHAMPOO, SUSPENSION TOPICAL
Qty: 120 | Refills: 0 | Status: ACTIVE | COMMUNITY
Start: 2019-05-18

## 2019-08-13 RX ORDER — METHYLPREDNISOLONE 4 MG/1
4 TABLET ORAL
Qty: 21 | Refills: 0 | Status: ACTIVE | COMMUNITY
Start: 2019-03-25

## 2019-08-13 RX ORDER — MONTELUKAST 10 MG/1
10 TABLET, FILM COATED ORAL
Qty: 30 | Refills: 0 | Status: ACTIVE | COMMUNITY
Start: 2019-08-02

## 2019-08-13 NOTE — PROCEDURE
[FreeTextEntry3] : Nasal Endoscopy:\par septum midline and intact, nasal airways widely patent\par middle meati widely patent, paranasal sinuses widely patent\par mild crust over left SPA region\par frontals widely patent b/l, no mucopus, edema or polyps.\par

## 2019-08-13 NOTE — CONSULT LETTER
[Dear  ___] : Dear  [unfilled], [Consult Closing:] : Thank you very much for allowing me to participate in the care of this patient.  If you have any questions, please do not hesitate to contact me. [Courtesy Letter:] : I had the pleasure of seeing your patient, [unfilled], in my office today. [Sincerely,] : Sincerely, [FreeTextEntry3] : Gilmar Ordoñez MD\par Department of Otolaryngology - Head and Neck Surgery\[facility] [DrKen  ___] : Dr. MELO [DrKen ___] : Dr. MELO

## 2019-08-13 NOTE — DATA REVIEWED
[de-identified] : CT Sinus 3/2019 see HPI\par \par CT Sinus 2/13/19 (I reviewed images):\par pansinus mucosal disease, completely opacified right frontal, bilateral ethmoids and right sphenoid, air-fluid level left maxillary sinus. relative sparing of left frontal.

## 2019-08-13 NOTE — HISTORY OF PRESENT ILLNESS
[de-identified] : 29F here in routine followup.\par \par She is s/p SMR/turbs/ESS (maxillary, ethmoid, sphenoid, frontal) for chronic pansinusitis without polyps 4/8/19. Intraoperatively, there was thick mucus draining from left maxillary and and occluded right frontal outflow with polypoid edema. Postoperative course c/b left sided epistaxis s/p control in OR 4/16/19. Pathology c/w eosinophilic sinusitis.\par \par She is doing very well since last seen with no complaints. She remains on the mometasone rinses daily. There is no pain, congestion, headache or pressure. She has not needed any inhalers since surgery. she feels great.\par \par ROS otherwise unremarkable

## 2019-08-13 NOTE — ASSESSMENT
[FreeTextEntry1] : 29F here in routine followup visit. She is s/p SMR/turbs/ESS (maxillary, ethmoid, sphenoid, frontal) for chronic pansinusitis without polyps 4/8/19. Intraoperatively, there was thick mucus draining from left maxillary and an occluded right frontal outflow with polypoid edema. Pathology c/w eosinophilic sinusitis. Postoperative course was c/b left sided epistaxis s/p control in OR 4/16/19. She is doing very well since last seen with no complaints. She remains on the mometasone rinses daily. There is no pain, congestion, headache or pressure. On exam, nasal endoscopy shows well healed postoperative changes with widely patent paranasal sinuses, middle meati and nasal airways. She is doing very well. Continue daily mometasone rinses and allergy meds. RTO 3-4 months for routine followup.

## 2019-08-21 NOTE — ED ADULT TRIAGE NOTE - MEANS OF ARRIVAL
Subjective:       Patient ID: Lee Quintanilla is a 41 y.o. male.    Chief Complaint: Pain of the Lumbar Spine (MRI Lumbar/ CT lumbar)      History of Present Illness   Patient presents with complaints of low back pain and bilateral leg pain. The history is that he underwent an L5-S1 laminectomy and spinal fusion with interbody devices and segmental instrumentation performed by Dr. Simms on 06/06/2016. Following surgery due to continued symptoms he had an epidural steroid injection in October 2016 medial branch blocks December 2016 and radiofrequency ablation December 2016. He saw Dr. Lobo Quiroga who ordered a CT scan of the lumbar spine on 05/17/2018. following his lumbar surgery his pain never resolved. Prior to surgery he has symptoms that radiated to the left leg prior before surgery and now he has bilateral leg complaints he has numbness of his legs. no bowel or bladder incontinence symptoms on a constant daily basis he is here to discuss results of the CAT scan and MRI study    Current Medications  Current Outpatient Medications   Medication Sig Dispense Refill    anastrozole (ARIMIDEX) 1 mg Tab Take 1 mg by mouth once daily.      DULoxetine (CYMBALTA) 30 MG capsule TAKE ONE CAPSULE BY MOUTH DAILY FOR 2 WEEKS THEN INCREASE TO TAKE TWO CAPSULES EVERY DAY 60 capsule 5    hydroCHLOROthiazide (HYDRODIURIL) 25 MG tablet Take 1 tablet (25 mg total) by mouth once daily. 90 tablet 3    rosuvastatin (CRESTOR) 10 MG tablet Take 0.5 tablets (5 mg total) by mouth once daily. 90 tablet 3    testosterone cypionate (DEPOTESTOTERONE CYPIONATE) 100 mg/mL injection Inject into the muscle once a week.       No current facility-administered medications for this visit.        Allergies  Review of patient's allergies indicates:   Allergen Reactions    Inapsine [droperidol] Anaphylaxis     seizures    Pcn [penicillins]        Past Medical History  Past Medical History:   Diagnosis Date    ADHD (attention deficit  hyperactivity disorder)     Arthritis     Asthma     as child only    Back pain     Degeneration of lumbar intervertebral disc 05/2016    Depression     Elevated PSA     Headache     Hyperlipidemia     Hypertension     Kidney damage     stage 3 ; d/t aleve abuse    Kidney stone     Neck pain     Numbness and tingling in hands     Numbness and tingling of both legs     Seizures     from inapsine 2002    Sleep apnea     no cpap       Surgical History  Past Surgical History:   Procedure Laterality Date    BACK SURGERY  2016    back surgery    BLOCK-NERVE-MEDIAL BRANCH-LUMBAR Bilateral 12/6/2016    [facility]    CYSTOSCOPY N/A 2/20/2017    [facility] at [facility]    CYSTOSCOPY WITH STENT PLACEMENT Left 4/5/2017    [facility]    FUSION-LAMINECTOMY-LUMBAR-L5-S1 Decompression and PSIF N/A 6/6/2016    [facility]    HERNIA REPAIR Bilateral 11/22/2017    INJECTION-STEROID-EPIDURAL-TRANSFORAMINAL Bilateral 10/14/2016    [facility]    INJECTION-STEROID-EPIDURAL-TRANSFORAMINAL Left 3/18/2016    [facility]    LITHOTRIPSY      LITHOTRIPSY-EXTRACORPOREAL SHOCK WAVE Left 4/5/2017    [facility] at [facility] OR    PILONIDAL CYST DRAINAGE      RADIOFREQUENCY THERMOCOAGULATION (RFTC)-NERVE-MEDIAN BRANCH-LUMBAR Bilateral 12/20/2016    [facility] OR    removal of abcess      scrotal    REPAIR-HERNIA-INGUINAL-ROBOTIC Bilateral 11/22/2017    [facility] Dakota [facility] at [facility] OR    TYMPANOSTOMY TUBE PLACEMENT         Family History:   Family History   Problem Relation Age of Onset    Heart disease Mother     Migraines Mother     Hypertension Mother     Early death Father     Diabetes Maternal Aunt     Diabetes Maternal Uncle     Diabetes Maternal Grandfather         Social History:   Social History     Socioeconomic History    Marital status:      Spouse name: Not on file    Number of children: Not on file    Years of education: Not on file    Highest education level: Not on file   Occupational History    Not on file   Social Needs    Financial resource strain: Not on file    Food insecurity:     Worry: Not on file     Inability: Not on file    Transportation needs:     Medical: Not on file     Non-medical: Not on file   Tobacco Use    Smoking status: Former Smoker     Packs/day: 0.25     Types: Cigarettes     Last attempt to quit: 1/1/2016     Years since quitting: 3.6    Smokeless tobacco: Former User     Quit date: 6/5/2016    Tobacco comment: occasional   Substance and Sexual Activity    Alcohol use: Yes     Comment: rare    Drug use: No    Sexual activity: Yes     Partners: Female   Lifestyle    Physical activity:     Days per week: Not on file     Minutes per session: Not on file    Stress: Not on file   Relationships    Social connections:     Talks on phone: Not on file     Gets together: Not on file     Attends Muslim service: Not on file     Active member of club or organization: Not on file     Attends meetings of clubs or organizations: Not on file     Relationship status: Not on file   Other Topics Concern    Not on file   Social History Narrative    Not on file       Hospitalization/Major Diagnostic Procedure:     Review of Systems     General/Constitutional:  Chills denies. Fatigue denies. Fever denies. Weight gain denies. Weight loss denies.    Respiratory:  Shortness of breath denies.    Cardiovascular:  Chest pain denies.    Gastrointestinal:  Constipation denies. Diarrhea denies. Nausea denies. Vomiting denies.     Hematology:  Easy bruising denies. Prolonged bleeding denies.     Genitourinary:  Frequent urination denies. Pain in lower back denies. Painful urination denies.     Musculoskeletal:  See HPI for details    Skin:   Rash denies.    Neurologic:  Dizziness denies. Gait abnormalities denies. Seizures denies. Tingling/Numbess denies.    Psychiatric:  Anxiety denies. Depressed mood denies.     Objective:   Vital Signs:   Vitals:    08/21/19 1526   BP: 138/80   Pulse: 96        Physical Exam      General Examination:     Constitutional: The patient is alert and oriented to lace person and time. Mood is pleasant.     Head/Face: Normal facial features normal eyebrows    Eyes: Normal extraocular motion bilaterally    Lungs: Respirations are equal and unlabored    Gait is coordinated.    Cardiovascular: There are no swelling or varicosities present.    Lymphatic: Negative for adenopathy    Skin: Normal    Neurological: Level of consciousness normal. Oriented to place person and time and situation    Psychiatric: Oriented to time place person and situation    Physical exam shows patient has difficulty standing erect he has moderate bilateral paraspinous muscle spasm range of motion in all directions is markedly restricted straight leg raising maneuver is positive on the right side and left sides subjective numbness L4 nerve root distribution bilaterally. Peripheral pulses intact.  XRAY Report/ Interpretation: CT scan lumbar spine was personally reviewed there is evidence of an interbody device at the L4-5 level there is a transitional lumbosacral junction there is a lack of fusion of the interbody device at L4-5 anteriorly. There is no evidence of a posterior lateral fusion mass present. MRI study of the lumbar spine was personally reviewed. There is moderate disc protrusion with central and foraminal stenosis at the L3-4 level with disc degeneration not present prior to her previous surgery      Assessment:       1. History of lumbar spinal fusion    2. Pseudoarthrosis of lumbar spine    3. Lumbar radiculopathy    4. DDD (degenerative disc disease), lumbar    5. Spinal stenosis of lumbar region with neurogenic claudication        Plan:        Lee was seen today for pain.    Diagnoses and all orders for this visit:    History of lumbar spinal fusion  Comments:  L4-5    Pseudoarthrosis of lumbar spine  Comments:  L4-5 anteriorly    Lumbar radiculopathy    DDD (degenerative disc disease), lumbar  Comments:  L3-4    Spinal stenosis of lumbar region with neurogenic claudication  Comments:  L3-4         No follow-ups on file.    Treatment options were discussed regards to the nature of the spinal condition conservative pain interventional and surgical options were discussed in detail and the probability of success of the separate treatment options was discussed in detail the patient expressed a clear understanding of the treatment options would regards to surgery the procedure risks benefits complications and outcomes were discussed.  No guarantees were given regards to surgical outcome.The technical aspects of the surgery were discussed in detail with the patient today. The patient was able to verbalize an understanding. The procedure risk benefits alternatives and possible complications of the surgical procedure was discussed including expected outcomes. No guarantees were given regards to outcomes. Consent forms were will be signed at a later date. All questions regarding the surgery itself were answered. The patient wishes to proceed with surgery and will be scheduled. The patient may require preoperative medical clearance.  Unfortunately this gentleman has not improved his previous surgery that looks like it was performed technically very well MRI shows evidence of new onset of disc degeneration and central foraminal stenosis above the previously instrumented L4-5 fusion. 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 This gentleman is miserable. The realistic expectations of surgery was discussed. I've explained the need for a vascular access surgeon Dr. Vincent Pang to assist with the anterior portion of a proposed surgery. The pros and  "cons of anterior surgery and the use of bone morphogenic protein with discussed.  We propose that this patient undergo an anterior lumbar interbody fusion at L3-4. We propose that we remove the fusion mass which is incomplete at L4-5 and inserted a new interbody device with bone morphogenic protein anteriorly at L4-5 this will complete up with disc space my Dr. Pang. We then propose a complete L3 laminectomy facetectomy foraminotomies and decompression of the L3 and L4 nerve roots. We recommend a reexploration bilateral L4-5 laminotomy and foraminotomies. We also recommend a repeat posterior lateral fusion from L3-L5 with segment instrumentation L3-L5 segmental his mentation may required removal of previous nonsegmental instrumentation at L4-5  Realistic expectations of a posterior surgery were discussed patient clearly understands he will never be made "normal" he would like to proceed with surgery as discussed    This note was created using Dragon voice recognition software that occasionally misinterpreted phrases or words.  " ambulatory

## 2019-10-17 NOTE — ED ADULT TRIAGE NOTE - NS ED NOTE AC HIGH RISK COUNTRIES
No
Musculoskeletal Pain    Musculoskeletal pain is muscle and bone aches and pains. This pain can occur in any part of the body.    Follow these instructions at home:  Only take medicines for pain, discomfort, or fever as told by your health care provider.  You may continue all activities unless the activities cause more pain. When the pain lessens, slowly resume normal activities. Gradually increase the intensity and duration of the activities or exercise.  During periods of severe pain, bed rest may be helpful. Lie or sit in any position that is comfortable, but get out of bed and walk around at least every several hours.  If directed, put ice on the injured area.    Put ice in a plastic bag.  Place a towel between your skin and the bag.  Leave the ice on for 20 minutes, 2–3 times a day.    Contact a health care provider if:  Your pain is getting worse.  Your pain is not relieved with medicines.  You lose function in the area of the pain if the pain is in your arms, legs, or neck.  This information is not intended to replace advice given to you by your health care provider. Make sure you discuss any questions you have with your health care provider.

## 2019-12-03 ENCOUNTER — APPOINTMENT (OUTPATIENT)
Dept: OTOLARYNGOLOGY | Facility: CLINIC | Age: 29
End: 2019-12-03

## 2020-01-02 RX ORDER — PREDNISONE 20 MG/1
20 TABLET ORAL
Qty: 10 | Refills: 0 | Status: ACTIVE | COMMUNITY
Start: 2020-01-02 | End: 1900-01-01

## 2020-01-02 RX ORDER — AMOXICILLIN AND CLAVULANATE POTASSIUM 875; 125 MG/1; MG/1
875-125 TABLET, COATED ORAL
Qty: 14 | Refills: 0 | Status: ACTIVE | COMMUNITY
Start: 2020-01-02 | End: 1900-01-01

## 2020-01-30 ENCOUNTER — APPOINTMENT (OUTPATIENT)
Dept: OTOLARYNGOLOGY | Facility: CLINIC | Age: 30
End: 2020-01-30
Payer: COMMERCIAL

## 2020-01-30 VITALS
DIASTOLIC BLOOD PRESSURE: 81 MMHG | HEART RATE: 78 BPM | SYSTOLIC BLOOD PRESSURE: 131 MMHG | HEIGHT: 65 IN | WEIGHT: 194.2 LBS | BODY MASS INDEX: 32.36 KG/M2

## 2020-01-30 PROCEDURE — 31231 NASAL ENDOSCOPY DX: CPT

## 2020-01-30 PROCEDURE — 99213 OFFICE O/P EST LOW 20 MIN: CPT | Mod: 25

## 2020-01-30 NOTE — HISTORY OF PRESENT ILLNESS
[de-identified] : 29F here in routine followup.\par \par She is s/p SMR/turbs/ESS (maxillary, ethmoid, sphenoid, frontal) for chronic pansinusitis without polyps 4/8/19. Intraoperatively, there was thick mucus draining from left maxillary and and occluded right frontal outflow with polypoid edema. Postoperative course c/b left sided epistaxis s/p control in OR 4/16/19. Pathology c/w eosinophilic sinusitis.\par \par She is doing very well since last seen with no complaints. Last month she developed a sinus infection which resolved with augmentin/prednisone and sx resolved shortly thereafter.\par She remains on the mometasone rinses daily. There is no pain, congestion, headache or pressure. She has not needed any inhalers since surgery. She feels great.\par \par ROS otherwise unremarkable

## 2020-01-30 NOTE — CONSULT LETTER
[Dear  ___] : Dear  [unfilled], [Courtesy Letter:] : I had the pleasure of seeing your patient, [unfilled], in my office today. [Consult Closing:] : Thank you very much for allowing me to participate in the care of this patient.  If you have any questions, please do not hesitate to contact me. [Sincerely,] : Sincerely, [FreeTextEntry3] : Gilmar Ordoñez MD\par Department of Otolaryngology - Head and Neck Surgery\par [facility] [DrKen  ___] : Dr. MELO [DrKen ___] : Dr. MELO

## 2020-01-30 NOTE — PROCEDURE
[FreeTextEntry3] : Nasal Endoscopy:\par septum midline and intact, nasal airways widely patent\par middle meati widely patent, paranasal sinuses widely patent\par mild crust over left SPA region removed\par frontals widely patent b/l, no mucopus, edema or polyps.\par

## 2020-01-30 NOTE — ASSESSMENT
[FreeTextEntry1] : 29F here in routine followup visit. 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 Intraoperatively, there was thick mucus draining from left maxillary and an occluded right frontal outflow with polypoid edema. Postoperative course was c/b left sided epistaxis s/p control in OR 4/16/19. She is doing very well since last seen with no complaints. Last month she developed a sinus infection which resolved with augmentin/prednisone and sx resolved shortly thereafter. She remains on the mometasone rinses daily. There is no pain, congestion, headache or pressure. On exam, nasal endoscopy shows well healed postoperative changes with widely patent paranasal sinuses, middle meati and nasal airways. She is doing very well. Continue daily mometasone rinses and allergy meds. RTO 4-6 months for routine followup.

## 2020-01-30 NOTE — DATA REVIEWED
[de-identified] : CT Sinus 3/2019 see HPI\par \par CT Sinus 2/13/19 (I reviewed images):\par pansinus mucosal disease, completely opacified right frontal, bilateral ethmoids and right sphenoid, air-fluid level left maxillary sinus. relative sparing of left frontal.

## 2020-02-22 ENCOUNTER — RESULT REVIEW (OUTPATIENT)
Age: 30
End: 2020-02-22

## 2020-06-30 ENCOUNTER — APPOINTMENT (OUTPATIENT)
Dept: OTOLARYNGOLOGY | Facility: CLINIC | Age: 30
End: 2020-06-30
Payer: COMMERCIAL

## 2020-06-30 VITALS
DIASTOLIC BLOOD PRESSURE: 53 MMHG | HEART RATE: 97 BPM | BODY MASS INDEX: 31.65 KG/M2 | SYSTOLIC BLOOD PRESSURE: 90 MMHG | TEMPERATURE: 98.25 F | WEIGHT: 190 LBS | HEIGHT: 65 IN

## 2020-06-30 DIAGNOSIS — J34.3 HYPERTROPHY OF NASAL TURBINATES: ICD-10-CM

## 2020-06-30 DIAGNOSIS — J32.4 CHRONIC PANSINUSITIS: ICD-10-CM

## 2020-06-30 PROCEDURE — 31231 NASAL ENDOSCOPY DX: CPT

## 2020-06-30 PROCEDURE — 99213 OFFICE O/P EST LOW 20 MIN: CPT | Mod: 25

## 2020-06-30 RX ORDER — MOMETASONE FUROATE 100 %
POWDER (GRAM) MISCELLANEOUS
Qty: 1 | Refills: 2 | Status: ACTIVE | COMMUNITY
Start: 2019-04-16 | End: 1900-01-01

## 2020-06-30 NOTE — ASSESSMENT
[FreeTextEntry1] : 29F here in routine followup visit. 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 \par She is doing very well since last seen 6 months ago with no complaints and feels great. Two months ago or so she thinks she contracted covid - but those sx improved after 1-2 weeks; she has been tested for antibodies and that is pending. In December 2019 she developed a sinus infection which resolved with augmentin/prednisone and sx resolved shortly thereafter. She is only on saline rinses and has not been on steroid irrigations in several months. There is no pain, congestion, headache or pressure. On exam, nasal endoscopy shows well healed postoperative changes with widely patent paranasal sinuses, middle meati and nasal airways. She is doing very well. Continue daily mometasone rinses and allergy meds. \par She is moving to [facility] next week for work. Will send her contact info for rhinologists in local area, around [facility]. I will also renew mometasone rinses. Doing well!

## 2020-06-30 NOTE — DATA REVIEWED
[de-identified] : CT Sinus 3/2019 see HPI\par \par CT Sinus 2/13/19 (I reviewed images):\par pansinus mucosal disease, completely opacified right frontal, bilateral ethmoids and right sphenoid, air-fluid level left maxillary sinus. relative sparing of left frontal.

## 2020-06-30 NOTE — CONSULT LETTER
[Dear  ___] : Dear  [unfilled], [Consult Closing:] : Thank you very much for allowing me to participate in the care of this patient.  If you have any questions, please do not hesitate to contact me. [Courtesy Letter:] : I had the pleasure of seeing your patient, [unfilled], in my office today. [Sincerely,] : Sincerely, [FreeTextEntry3] : Gilmar Ordoñez MD\par Department of Otolaryngology - Head and Neck Surgery\par [facility] [DrKen ___] : Dr. MELO [DrKen  ___] : Dr. MELO

## 2020-06-30 NOTE — HISTORY OF PRESENT ILLNESS
[de-identified] : 29F here in routine followup.\par \par She is s/p SMR/turbs/ESS (maxillary, ethmoid, sphenoid, frontal) for chronic pansinusitis without polyps 4/8/19. Intraoperatively, there was thick mucus draining from left maxillary and and occluded right frontal outflow with polypoid edema. Postoperative course c/b left sided epistaxis s/p control in OR 4/16/19. Pathology c/w eosinophilic sinusitis.\par \par She is doing very well since last seen 6 months ago with no complaints. Two months or so she thinks she contracted covid  -but those sx improved after 1-2 weeks; she has been tested for antibodies and that is pending.\par In December 2019 she developed a sinus infection which resolved with augmentin/prednisone and sx resolved shortly thereafter.\par She is only on saline rinses and has not been on steroid irrigations in several months. There is no pain, congestion, headache or pressure and she feels great.\par \par ROS otherwise unremarkable

## 2020-09-28 NOTE — ED PROVIDER NOTE - GASTROINTESTINAL, MLM
RISK                                                          Points  [] Previous VTE                                           3  [] Thrombophilia                                        2  [] Lower limb paralysis                              2   [] Current Cancer                                       2   [x] Immobilization > 24 hrs                        1  [] ICU/CCU stay > 24 hours                       1  [] Age > 60                                                   1    Xarelto
Abdomen soft, non-tender, no guarding.